# Patient Record
Sex: FEMALE | Race: WHITE | NOT HISPANIC OR LATINO | URBAN - METROPOLITAN AREA
[De-identification: names, ages, dates, MRNs, and addresses within clinical notes are randomized per-mention and may not be internally consistent; named-entity substitution may affect disease eponyms.]

---

## 2017-01-23 ENCOUNTER — OFFICE VISIT - RIVER FALLS (OUTPATIENT)
Dept: FAMILY MEDICINE | Facility: CLINIC | Age: 77
End: 2017-01-23

## 2017-01-23 ASSESSMENT — MIFFLIN-ST. JEOR: SCORE: 1140.4

## 2017-02-06 ENCOUNTER — OFFICE VISIT - RIVER FALLS (OUTPATIENT)
Dept: FAMILY MEDICINE | Facility: CLINIC | Age: 77
End: 2017-02-06

## 2022-02-12 VITALS
SYSTOLIC BLOOD PRESSURE: 126 MMHG | BODY MASS INDEX: 25.61 KG/M2 | HEART RATE: 96 BPM | WEIGHT: 150 LBS | HEIGHT: 64 IN | DIASTOLIC BLOOD PRESSURE: 80 MMHG | TEMPERATURE: 99.1 F

## 2022-02-15 NOTE — PROGRESS NOTES
Patient:   ANIKA LOPEZ            MRN: [id number]            FIN: [id number]               Age:   76 years     Sex:  Female     :  1940   Associated Diagnoses:   Cold   Author:   Colby Knott MD      Visit Information      Date of Service: 2017 01:35 pm  Performing Location: [facility]  Encounter#: [id number]   Visit type:  New symptom.       Chief Complaint   2017 2:10 PM CST    c/o cough/head congestion since last .  cough is loose cough.  recently came back from being on a cruise, along with her .        History of Present Illness             The patient presents with symptoms of an upper respiratory infection.  The symptoms of the upper respiratory infection are described as rhinorrhea, nasal congestion, cough and sputum production.  The severity of the symptoms associated to the upper respiratory infection is moderate.  The timing/course of upper respiratory infection symptoms is improving.  The symptoms of upper respiratory infection have lasted for 10 day(s).  The context of the upper respiratory infection symptoms: occurred after foreign travel and in association with illness.  Exacerbating factors consist of none.  Relieving factors consist of rest.  Associated symptoms consist of ear pain, denies difficulty swallowing, denies fever and denies nausea.        Review of Systems   All other systems were reviewed and are negative.      Health Status   Allergies:    Allergic Reactions (Selected)  No known allergies   Medications:  (Selected)   Prescriptions  Prescribed  Estrace Vaginal 0.1 mg/g vaginal cream: 1 gram, vag, 3x/wk, Instructions: Does not need right now, # 42 gm, 5 Refill(s), Type: Maintenance, Pharmacy: [id number], 1 gram vag 3x/wk,Instr:Does not need right now  fluticasone 50 mcg/inh nasal spray: 2 spray(s), nasal, daily, # 3 EA, 3 Refill(s), Type: Maintenance, Pharmacy: [facility] #[id number], 2 spray(s) nasal  daily  hydrochlorothiazide-lisinopril 12.5 mg-10 mg oral tablet: 1 tab(s), PO, Daily, # 90 tab(s), 3 Refill(s), Type: Maintenance, Pharmacy: [id number], 1 tab(s) po daily,x90 day(s)  Documented Medications  Documented  Daily Multiple Vitamins: po, daily, 0 Refill(s), Type: Maintenance  Lactaid: 0 Refill(s), Type: Soft Stop  Prilosec OTC: See Instructions, Instructions: 20 mg PO q 4months x 2 weeks, 0 Refill(s), Type: Soft Stop  Vitamin D3 1000 intl units oral tablet: 1 tab(s) ( 1,000 International Unit ), po, daily, 0 Refill(s), Type: Maintenance   Problem list:    All Problems (Selected)  Allergic Rhinitis / ICD-9-.9 / Confirmed  Brittle Nails / ICD-9-.8 / Confirmed  Closed Fracture of Finger / ICD-9-.00 / Confirmed  Colon cancer / SNOMED CT 910961478 / Confirmed  Hypertension / SNOMED CT 0098638194 / Confirmed  Keratosis, actinic / ICD-9-.0 / Confirmed  Menarche / ICD-9-CM V21.8 / Confirmed  Osteoporosis / ICD-9-.00 / Confirmed  Parathyroid adenoma / SNOMED CT 091375081 / Confirmed  Sinusitis / SNOMED CT 13709739 / Confirmed  Varicose veins / SNOMED CT 598362688 / Confirmed      Physical Examination   Vital Signs   1/23/2017 2:10 PM CST Temperature Tympanic 99.1 DegF    Peripheral Pulse Rate 96 bpm    Pulse Site Radial artery    HR Method Manual    Systolic Blood Pressure 126 mmHg    Diastolic Blood Pressure 80 mmHg    Mean Arterial Pressure 95 mmHg    BP Site Right arm    BP Method Manual      Measurements from flowsheet : Measurements   1/23/2017 2:10 PM CST Height Measured - Standard 64 in    Weight Measured - Standard 150 lb    BSA 1.75 m2    Body Mass Index 25.74 kg/m2      General:  Alert and oriented, No acute distress.    Eye:  Pupils are equal, round and reactive to light.    HENT:  Normocephalic, Tympanic membranes are clear, posterior pharyngeal drainage.    Neck:  Supple, Non-tender.    Respiratory:  Lungs are clear to auscultation, Respirations are non-labored.     Cardiovascular:  Normal rate, Regular rhythm.    Neurologic:  Alert, Oriented.    Psychiatric:  Cooperative, Appropriate mood & affect.       Impression and Plan   Diagnosis     Cold (KVZ44-CI J00).     Course:  Improving.    Plan:  symptomatic care and follow up if not improving, antitussives.    Orders     Orders   Pharmacy:  [facility] 8 mg-200 mg/5 mL oral liquid (Prescribe): 10 mL, po, q4 hrs, # 120 mL, 0 Refill(s), Type: Acute, Pharmacy: [facility] #[id number], 10 mL po q4 hrs.